# Patient Record
(demographics unavailable — no encounter records)

---

## 2024-10-22 NOTE — CONSULT LETTER
[Dear  ___] : Dear  [unfilled], [Consult Letter:] : I had the pleasure of evaluating your patient, [unfilled]. [Consult Closing:] : Thank you very much for allowing me to participate in the care of this patient.  If you have any questions, please do not hesitate to contact me. no

## 2024-10-22 NOTE — REVIEW OF SYSTEMS
[Feeling Poorly] : not feeling poorly [Feeling Tired] : not feeling tired [Confused or Disoriented] : no confusion [Memory Lapses or Loss] : no memory loss [Decr. Concentrating Ability] : no decrease in concentrating ability [Difficulty with Language] : no ~M difficulty with language [Changed Thought Patterns] : no change in thought patterns [Repeating Questions] : no repeated questioning about recent events [Facial Weakness] : no facial weakness [Arm Weakness] : no arm weakness [Hand Weakness] : no hand weakness [Leg Weakness] : no leg weakness [Poor Coordination] : good coordination [Difficulty Writing] : no difficulty writing [Difficulties in Speech] : no speech difficulties [Numbness] : no numbness [Tingling] : no tingling [Seizures] : no convulsions [Dizziness] : no dizziness [Fainting] : no fainting [Lightheadedness] : no lightheadedness [Vertigo] : no vertigo [Tension Headache] : no tension-type headache [Difficulty Walking] : no difficulty walking [Eyesight Problems] : no eyesight problems [Chest Pain] : no chest pain [Palpitations] : no palpitations [Shortness Of Breath] : no shortness of breath [Wheezing] : no wheezing [Vomiting] : no vomiting [Incontinence] : no incontinence [Joint Pain] : no joint pain [Easy Bleeding] : no tendency for easy bleeding [Easy Bruising] : no tendency for easy bruising

## 2024-10-22 NOTE — PHYSICAL EXAM
[General Appearance - Alert] : alert [General Appearance - Well Nourished] : well nourished [General Appearance - Well Developed] : well developed [Oriented To Time, Place, And Person] : oriented to person, place, and time [Affect] : the affect was normal [Mood] : the mood was normal [Person] : oriented to person [Place] : oriented to place [Time] : oriented to time [Concentration Intact] : normal concentrating ability [Visual Intact] : visual attention was ~T not ~L decreased [Naming Objects] : no difficulty naming common objects [Repeating Phrases] : no difficulty repeating a phrase [Writing A Sentence] : no difficulty writing a sentence [Fluency] : fluency intact [Comprehension] : comprehension intact [Reading] : reading intact [Past History] : adequate knowledge of personal past history [Cranial Nerves Optic (II)] : visual acuity intact bilaterally,  visual fields full to confrontation, pupils equal round and reactive to light [Cranial Nerves Oculomotor (III)] : extraocular motion intact [Cranial Nerves Trigeminal (V)] : facial sensation intact symmetrically [Cranial Nerves Facial (VII)] : face symmetrical [Cranial Nerves Vestibulocochlear (VIII)] : hearing was intact bilaterally [Cranial Nerves Glossopharyngeal (IX)] : tongue and palate midline [Cranial Nerves Accessory (XI - Cranial And Spinal)] : head turning and shoulder shrug symmetric [Cranial Nerves Hypoglossal (XII)] : there was no tongue deviation with protrusion [Motor Tone] : muscle tone was normal in all four extremities [Motor Strength] : muscle strength was normal in all four extremities [No Muscle Atrophy] : normal bulk in all four extremities [Sensation Tactile Decrease] : light touch was intact [Balance] : balance was intact [Full Visual Field] : full visual field [Hearing Threshold Finger Rub Not [place]] : hearing was normal [Neck Appearance] : the appearance of the neck was normal [Heart Rate And Rhythm] : heart rate was normal and rhythm regular [Edema] : there was no peripheral edema [Abnormal Walk] : normal gait

## 2024-10-22 NOTE — HISTORY OF PRESENT ILLNESS
[FreeTextEntry1] : Nay Toth is a 58 year old woman with a PMHX of Uveitis, headaches, HTN, HLD, TMJ referred by Dr. Pereira for an incidental aneurysm. Imaging was done for headache. 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 No other aneurysms. No vascular malformations. She comes in today for a follow up visit. Right groin healed well, no ecchymosis or pain noted.

## 2024-10-22 NOTE — DISCUSSION/SUMMARY
[FreeTextEntry1] : Nay Toth is a 58 year old woman with a PMHX of Uveitis, headaches, HTN, HLD, TMJ referred by Dr. Pereira for an incidental aneurysm. 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 No other aneurysms. No vascular malformations. Angiogram results discussed in detail and treatment options with Pipeline stent placement discussed. The procedure, risks, benefits and alternatives discussed in detail. She does not feel ready to undergo the procedure and wants to wait until January 2025. I will see her again at that time. All of her questions and concerns were addressed.

## 2024-10-29 NOTE — ASSESSMENT
[FreeTextEntry1] : Based on history and physical the patient has a high likelihood of having obstructive sleep apnea. Further assessment by sleep testing is recommended. There is no contraindication to a home sleep study. We will therefore proceed to two night home apnea sleep study for further assessment. There may be issues with insomnia as well which we will need to address after LANDY has been evaluated. There may be a component of sleep stage misperception as well. Did explain to patient that if aneurysm surgery is urgent there is no need to defer this pending sleep apnea evaluation.  Aneurysm surgery will almost certainly require general anesthesia with intubation.  As this is the case patient can be managed with perioperative LANDY precautions without specifically doing a sleep study before surgery.

## 2024-10-29 NOTE — HISTORY OF PRESENT ILLNESS
[FreeTextEntry1] : Patient here for initial sleep apnea evaluation.  Has history of loud snoring disrupted sleep and witnessed apneas.  Had episode of difficulty with anesthesia after colonoscopy and currently is being considered for brain aneurysm intervention.  Surgery not scheduled as of yet because patient is still considering intervention.  Also reports chronically disrupted sleep related to stress-having difficulty with child and caregiver needs.  Reports frequent insomnia with prolonged nocturnal awakenings.    [Obstructive Sleep Apnea] : obstructive sleep apnea [Snoring] : snoring [Witnessed Apneas] : witnessed sleep apnea [Frequent Nocturnal Awakening] : frequent nocturnal awakening [Unintentional Sleep While Inactive] : unintentional sleep while inactive [Awakes Unrefreshed] : awakening unrefreshed [Awakes with Headache] : headache upon awakening [Awakening With Dry Mouth] : awakening with dry mouth [Recent  Weight Gain] : no recent weight gain [DIS] : no DIS [DMS] : DMS [Unusual Sleep Behavior] : no unusual sleep behavior

## 2024-10-29 NOTE — PHYSICAL EXAM
[General Appearance - Well Developed] : well developed [Normal Appearance] : normal appearance [Well Groomed] : well groomed [General Appearance - Well Nourished] : well nourished [No Deformities] : no deformities [General Appearance - In No Acute Distress] : no acute distress [Normal Conjunctiva] : the conjunctiva exhibited no abnormalities [Eyelids - No Xanthelasma] : the eyelids demonstrated no xanthelasmas [Normal Oropharynx] : abnormal oropharynx [Low Lying Soft Palate] : low lying soft palate [Neck Appearance] : the appearance of the neck was normal [Neck Cervical Mass (___cm)] : no neck mass was observed [Jugular Venous Distention Increased] : there was no jugular-venous distention [Thyroid Diffuse Enlargement] : the thyroid was not enlarged [Thyroid Nodule] : there were no palpable thyroid nodules [Heart Sounds] : normal S1 and S2 [Heart Rate And Rhythm] : heart rate was normal and rhythm regular [Heart Sounds Gallop] : no gallops [Murmurs] : no murmurs [Heart Sounds Pericardial Friction Rub] : no pericardial rub [] : no respiratory distress [Auscultation Breath Sounds / Voice Sounds] : lungs were clear to auscultation bilaterally [Abnormal Walk] : normal gait [Nail Clubbing] : no clubbing  or cyanosis of the fingernails [Musculoskeletal - Swelling] : no joint swelling seen [Motor Tone] : muscle strength and tone were normal

## 2024-10-29 NOTE — PHYSICAL EXAM
[General Appearance - Well Developed] : well developed [Normal Appearance] : normal appearance [Well Groomed] : well groomed [General Appearance - Well Nourished] : well nourished [No Deformities] : no deformities [General Appearance - In No Acute Distress] : no acute distress [Normal Conjunctiva] : the conjunctiva exhibited no abnormalities [Eyelids - No Xanthelasma] : the eyelids demonstrated no xanthelasmas [Normal Oropharynx] : abnormal oropharynx [Low Lying Soft Palate] : low lying soft palate [Neck Appearance] : the appearance of the neck was normal [Neck Cervical Mass (___cm)] : no neck mass was observed [Jugular Venous Distention Increased] : there was no jugular-venous distention [Thyroid Diffuse Enlargement] : the thyroid was not enlarged [Thyroid Nodule] : there were no palpable thyroid nodules [Heart Rate And Rhythm] : heart rate was normal and rhythm regular [Heart Sounds] : normal S1 and S2 [Heart Sounds Gallop] : no gallops [Murmurs] : no murmurs [Heart Sounds Pericardial Friction Rub] : no pericardial rub [] : no respiratory distress [Auscultation Breath Sounds / Voice Sounds] : lungs were clear to auscultation bilaterally [Abnormal Walk] : normal gait [Nail Clubbing] : no clubbing  or cyanosis of the fingernails [Musculoskeletal - Swelling] : no joint swelling seen [Motor Tone] : muscle strength and tone were normal

## 2024-12-24 NOTE — HISTORY OF PRESENT ILLNESS
[FreeTextEntry1] : Nay Toth is a 58 year old woman with a PMHX of Uveitis, headaches, HTN, HLD, TMJ referred by Dr. Pereira for an incidental aneurysm. Imaging was done for headache. 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 No other aneurysms. No vascular malformations. She comes in today for a follow up visit. She denies any stroke/TIA events.

## 2024-12-24 NOTE — DISCUSSION/SUMMARY
[FreeTextEntry1] : Nay Toth is a 58 year old woman with a PMHX of Uveitis, headaches, HTN, HLD, TMJ referred by Dr. Pereira for an incidental aneurysm. 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 Angiogram results discussed in detail and treatment options with Pipeline stent placement discussed. The procedure, risks, benefits and alternatives discussed in detail and she now feels ready to proceed. Plan to start ASA 81 mg daily and Brilinta 90 mg BID 2 days prior to procedure. All questions and concerns addressed. She is scheduled for 1/15/25.

## 2024-12-24 NOTE — REVIEW OF SYSTEMS
[Feeling Poorly] : not feeling poorly [Feeling Tired] : not feeling tired [Confused or Disoriented] : no confusion [Memory Lapses or Loss] : no memory loss [Decr. Concentrating Ability] : no decrease in concentrating ability [Difficulty with Language] : no ~M difficulty with language [Changed Thought Patterns] : no change in thought patterns [Repeating Questions] : no repeated questioning about recent events [Facial Weakness] : no facial weakness [Arm Weakness] : no arm weakness [Hand Weakness] : no hand weakness [Leg Weakness] : no leg weakness [Poor Coordination] : good coordination [Difficulty Writing] : no difficulty writing [Numbness] : no numbness [Seizures] : no convulsions [Dizziness] : no dizziness [Lightheadedness] : no lightheadedness [Vertigo] : no vertigo [Tension Headache] : no tension-type headache [Difficulty Walking] : no difficulty walking [Anxiety] : no anxiety [Depression] : no depression [Eyesight Problems] : no eyesight problems [Loss Of Hearing] : no hearing loss [Shortness Of Breath] : no shortness of breath [Wheezing] : no wheezing [Vomiting] : no vomiting [Incontinence] : no incontinence [Easy Bleeding] : no tendency for easy bleeding [Easy Bruising] : no tendency for easy bruising

## 2024-12-24 NOTE — PHYSICAL EXAM
[General Appearance - Alert] : alert [General Appearance - Well Developed] : well developed [Oriented To Time, Place, And Person] : oriented to person, place, and time [Affect] : the affect was normal [Mood] : the mood was normal [Person] : oriented to person [Place] : oriented to place [Time] : oriented to time [Concentration Intact] : normal concentrating ability [Visual Intact] : visual attention was ~T not ~L decreased [Naming Objects] : no difficulty naming common objects [Repeating Phrases] : no difficulty repeating a phrase [Writing A Sentence] : no difficulty writing a sentence [Fluency] : fluency intact [Comprehension] : comprehension intact [Reading] : reading intact [Past History] : adequate knowledge of personal past history [Cranial Nerves Optic (II)] : visual acuity intact bilaterally,  visual fields full to confrontation, pupils equal round and reactive to light [Cranial Nerves Oculomotor (III)] : extraocular motion intact [Cranial Nerves Trigeminal (V)] : facial sensation intact symmetrically [Cranial Nerves Vestibulocochlear (VIII)] : hearing was intact bilaterally [Cranial Nerves Facial (VII)] : face symmetrical [Cranial Nerves Glossopharyngeal (IX)] : tongue and palate midline [Cranial Nerves Accessory (XI - Cranial And Spinal)] : head turning and shoulder shrug symmetric [Cranial Nerves Hypoglossal (XII)] : there was no tongue deviation with protrusion [Motor Strength] : muscle strength was normal in all four extremities [Motor Tone] : muscle tone was normal in all four extremities [No Muscle Atrophy] : normal bulk in all four extremities [Sensation Tactile Decrease] : light touch was intact [Balance] : balance was intact [Full Visual Field] : full visual field [Hearing Threshold Finger Rub Not [place]] : hearing was normal [] : no respiratory distress [Heart Rate And Rhythm] : heart rate was normal and rhythm regular [Abnormal Walk] : normal gait

## 2024-12-24 NOTE — CONSULT LETTER
[Dear  ___] : Dear  [unfilled], [Consult Letter:] : I had the pleasure of evaluating your patient, [unfilled]. [Please see my note below.] : Please see my note below. [Consult Closing:] : Thank you very much for allowing me to participate in the care of this patient.  If you have any questions, please do not hesitate to contact me. [Sincerely,] : Sincerely, [FreeTextEntry3] : August Avelar MD Chief, Vascular Neurology and Neurology Service , NeuroEndovascular Surgery Professor of Neurology and Radiology [employer] [facility] [employer] at [facility] Director, NeuroEndovascular Surgery [facility]

## 2025-01-28 NOTE — CONSULT LETTER
[Dear  ___] : Dear  [unfilled], [Consult Letter:] : I had the pleasure of evaluating your patient, [unfilled]. [Consult Closing:] : Thank you very much for allowing me to participate in the care of this patient.  If you have any questions, please do not hesitate to contact me. [Please see my note below.] : Please see my note below. [Sincerely,] : Sincerely, [FreeTextEntry3] : August Avelar MD Chief, Vascular Neurology and Neurology Service , NeuroEndovascular Surgery Professor of Neurology and Radiology [facility] [employer] at [facility] Director, NeuroEndovascular Surgery [facility]

## 2025-01-28 NOTE — PHYSICAL EXAM
[General Appearance - Alert] : alert [General Appearance - Well Nourished] : well nourished [General Appearance - Well Developed] : well developed [Oriented To Time, Place, And Person] : oriented to person, place, and time [Affect] : the affect was normal [Mood] : the mood was normal [Person] : oriented to person [Place] : oriented to place [Time] : oriented to time [Concentration Intact] : normal concentrating ability [Visual Intact] : visual attention was ~T not ~L decreased [Naming Objects] : no difficulty naming common objects [Repeating Phrases] : no difficulty repeating a phrase [Writing A Sentence] : no difficulty writing a sentence [Fluency] : fluency intact [Comprehension] : comprehension intact [Reading] : reading intact [Past History] : adequate knowledge of personal past history [Cranial Nerves Optic (II)] : visual acuity intact bilaterally,  visual fields full to confrontation, pupils equal round and reactive to light [Cranial Nerves Oculomotor (III)] : extraocular motion intact [Cranial Nerves Trigeminal (V)] : facial sensation intact symmetrically [Cranial Nerves Vestibulocochlear (VIII)] : hearing was intact bilaterally [Cranial Nerves Facial (VII)] : face symmetrical [Cranial Nerves Glossopharyngeal (IX)] : tongue and palate midline [Cranial Nerves Accessory (XI - Cranial And Spinal)] : head turning and shoulder shrug symmetric [Cranial Nerves Hypoglossal (XII)] : there was no tongue deviation with protrusion [Motor Tone] : muscle tone was normal in all four extremities [Motor Strength] : muscle strength was normal in all four extremities [No Muscle Atrophy] : normal bulk in all four extremities [Sensation Tactile Decrease] : light touch was intact [Balance] : balance was intact [Full Visual Field] : full visual field [Hearing Threshold Finger Rub Not [place]] : hearing was normal [Neck Appearance] : the appearance of the neck was normal [] : no respiratory distress [Heart Rate And Rhythm] : heart rate was normal and rhythm regular [Abnormal Walk] : normal gait

## 2025-01-28 NOTE — REVIEW OF SYSTEMS
[Feeling Poorly] : not feeling poorly [Feeling Tired] : not feeling tired [Confused or Disoriented] : no confusion [Memory Lapses or Loss] : no memory loss [Decr. Concentrating Ability] : no decrease in concentrating ability [Difficulty with Language] : no ~M difficulty with language [Changed Thought Patterns] : no change in thought patterns [Repeating Questions] : no repeated questioning about recent events [Facial Weakness] : no facial weakness [Arm Weakness] : no arm weakness [Hand Weakness] : no hand weakness [Leg Weakness] : no leg weakness [Poor Coordination] : good coordination [Difficulty Writing] : no difficulty writing [Difficulties in Speech] : no speech difficulties [Numbness] : no numbness [Seizures] : no convulsions [Dizziness] : no dizziness [Fainting] : no fainting [Lightheadedness] : no lightheadedness [Vertigo] : no vertigo [Tension Headache] : no tension-type headache [Difficulty Walking] : no difficulty walking [Depression] : no depression [Eyesight Problems] : no eyesight problems [Loss Of Hearing] : no hearing loss [Palpitations] : no palpitations [Wheezing] : no wheezing [Vomiting] : no vomiting [Easy Bleeding] : no tendency for easy bleeding [Easy Bruising] : no tendency for easy bruising

## 2025-01-28 NOTE — DISCUSSION/SUMMARY
[FreeTextEntry1] : Nay Toth is a 58 year old woman with a PMHX of Uveitis, headaches, HTN, HLD, TMJ referred by Dr. Pereira for an incidental aneurysm. She is 2 weeks s/p selective cerebral angiography and aneurysm embolization with 2 overlapping pipeline stents across the neck of the right paraophthalmic aneurysm. Post procedure MRI/MRA was stable. She will continue the ASA and Brilinta 60 mg BID.  Plan for repeat MRA HEAD NOVA in 6 months. I will see her again in 3 months. All of her questions and concerns were addressed.

## 2025-01-28 NOTE — HISTORY OF PRESENT ILLNESS
[FreeTextEntry1] : Nay Toth is a 58 year old woman with a PMHX of Uveitis, headaches, HTN, HLD, TMJ referred by Dr. Pereira for an incidental aneurysm. Imaging was done for headache. 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 No other aneurysms. No vascular malformations. She comes in today for a follow up visit. On 01/15 she underwent selective cerebral angiography and aneurysm embolization with 2 overlapping pipeline stents across the neck of the right paraophthalmic aneurysm. She is on ASA and Brilinta 60 mg BID. Post procedure MRI HEAD and MRA NOVA stable. Right groin healed well, no ecchymosis or pain noted. She denies any stroke/TIA events.

## 2025-07-29 NOTE — REVIEW OF SYSTEMS
[Easy Bruising] : a tendency for easy bruising [Feeling Poorly] : not feeling poorly [Feeling Tired] : not feeling tired [Confused or Disoriented] : no confusion [Memory Lapses or Loss] : no memory loss [Decr. Concentrating Ability] : no decrease in concentrating ability [Difficulty with Language] : no ~M difficulty with language [Changed Thought Patterns] : no change in thought patterns [Repeating Questions] : no repeated questioning about recent events [Facial Weakness] : no facial weakness [Arm Weakness] : no arm weakness [Hand Weakness] : no hand weakness [Leg Weakness] : no leg weakness [Poor Coordination] : good coordination [Difficulty Writing] : no difficulty writing [Difficulties in Speech] : no speech difficulties [Numbness] : no numbness [Dizziness] : no dizziness [Fainting] : no fainting [Vertigo] : no vertigo [Tension Headache] : no tension-type headache [Difficulty Walking] : no difficulty walking [Anxiety] : no anxiety [Depression] : no depression [Loss Of Hearing] : no hearing loss [Chest Pain] : no chest pain

## 2025-07-29 NOTE — DISCUSSION/SUMMARY
[FreeTextEntry1] : Nay Toth is a 59 year old woman with a PMHX of Uveitis, headaches, HTN, HLD, TMJ referred by Dr. Pereira for an incidental aneurysm. She is 6 months s/p selective cerebral angiography and aneurysm embolization with 2 overlapping pipeline stents across the neck of the right paraophthalmic aneurysm. Post procedure MRI/MRA was stable. Patient was unable to get repeat MRA HEAD. She will stop the Brilinta at this point and continue ASA monotherapy. Plan for repeat cerebral angiogram in October, 9 months post procedure. All of her questions and concerns were addressed.

## 2025-07-29 NOTE — PHYSICAL EXAM
[General Appearance - Alert] : alert [General Appearance - Well Nourished] : well nourished [General Appearance - Well Developed] : well developed [Oriented To Time, Place, And Person] : oriented to person, place, and time [Affect] : the affect was normal [Mood] : the mood was normal [Person] : oriented to person [Place] : oriented to place [Time] : oriented to time [Concentration Intact] : normal concentrating ability [Visual Intact] : visual attention was ~T not ~L decreased [Naming Objects] : no difficulty naming common objects [Repeating Phrases] : no difficulty repeating a phrase [Writing A Sentence] : no difficulty writing a sentence [Fluency] : fluency intact [Comprehension] : comprehension intact [Reading] : reading intact [Past History] : adequate knowledge of personal past history [Cranial Nerves Optic (II)] : visual acuity intact bilaterally,  visual fields full to confrontation, pupils equal round and reactive to light [Cranial Nerves Oculomotor (III)] : extraocular motion intact [Cranial Nerves Trigeminal (V)] : facial sensation intact symmetrically [Cranial Nerves Facial (VII)] : face symmetrical [Cranial Nerves Vestibulocochlear (VIII)] : hearing was intact bilaterally [Cranial Nerves Glossopharyngeal (IX)] : tongue and palate midline [Cranial Nerves Accessory (XI - Cranial And Spinal)] : head turning and shoulder shrug symmetric [Cranial Nerves Hypoglossal (XII)] : there was no tongue deviation with protrusion [Motor Tone] : muscle tone was normal in all four extremities [Motor Strength] : muscle strength was normal in all four extremities [No Muscle Atrophy] : normal bulk in all four extremities [Sensation Tactile Decrease] : light touch was intact [Balance] : balance was intact [Full Visual Field] : full visual field [Hearing Threshold Finger Rub Not [place]] : hearing was normal [Neck Appearance] : the appearance of the neck was normal [] : no respiratory distress [Heart Rate And Rhythm] : heart rate was normal and rhythm regular [Edema] : there was no peripheral edema [Abnormal Walk] : normal gait

## 2025-07-29 NOTE — HISTORY OF PRESENT ILLNESS
[FreeTextEntry1] : Nay Toth is a 59 year old woman with a PMHX of Uveitis, headaches, HTN, HLD, TMJ, recent detached retina referred by Dr. Pereira for an incidental aneurysm. Imaging was done for headache. 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 No other aneurysms. No vascular malformations. She comes in today for a follow up visit. On 01/15 she underwent selective cerebral angiography and aneurysm embolization with 2 overlapping pipeline stents across the neck of the right paraophthalmic aneurysm. She is on ASA and Brilinta 60 mg BID. Post procedure MRI HEAD and MRA NOVA stable. She comes in today for a follow up visit. She was supposed to have MRA NOVA but was unable to complete exam as she was too claustrophobic. She reports floaters in her right eye, has an appointment with retinal specialist next month.